# Patient Record
Sex: FEMALE | Race: WHITE | NOT HISPANIC OR LATINO | URBAN - METROPOLITAN AREA
[De-identification: names, ages, dates, MRNs, and addresses within clinical notes are randomized per-mention and may not be internally consistent; named-entity substitution may affect disease eponyms.]

---

## 2017-08-07 ENCOUNTER — OFFICE VISIT - HEALTHEAST (OUTPATIENT)
Dept: GERIATRICS | Facility: CLINIC | Age: 82
End: 2017-08-07

## 2017-08-07 DIAGNOSIS — I10 ESSENTIAL HYPERTENSION WITH GOAL BLOOD PRESSURE LESS THAN 140/90: ICD-10-CM

## 2017-08-07 DIAGNOSIS — I25.10 CORONARY ARTERY DISEASE DUE TO CALCIFIED CORONARY LESION: ICD-10-CM

## 2017-08-07 DIAGNOSIS — T79.6XXD TRAUMATIC RHABDOMYOLYSIS, SUBSEQUENT ENCOUNTER: ICD-10-CM

## 2017-08-07 DIAGNOSIS — S27.0XXD TRAUMATIC PNEUMOTHORAX, SUBSEQUENT ENCOUNTER: ICD-10-CM
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
## 2017-08-07 DIAGNOSIS — E03.4 HYPOTHYROIDISM DUE TO ACQUIRED ATROPHY OF THYROID: ICD-10-CM

## 2017-08-07 DIAGNOSIS — I25.84 CORONARY ARTERY DISEASE DUE TO CALCIFIED CORONARY LESION: ICD-10-CM

## 2017-08-07 DIAGNOSIS — F17.200 TOBACCO DEPENDENCE: ICD-10-CM

## 2017-08-07 DIAGNOSIS — H91.93 SEVERE HEARING LOSS, BILATERAL: ICD-10-CM

## 2017-08-07 DIAGNOSIS — S22.41XD CLOSED FRACTURE OF MULTIPLE RIBS OF RIGHT SIDE WITH ROUTINE HEALING, SUBSEQUENT ENCOUNTER: ICD-10-CM

## 2017-08-10 ENCOUNTER — OFFICE VISIT - HEALTHEAST (OUTPATIENT)
Dept: GERIATRICS | Facility: CLINIC | Age: 82
End: 2017-08-10

## 2017-08-10 DIAGNOSIS — F17.200 TOBACCO DEPENDENCE: ICD-10-CM

## 2017-08-10 DIAGNOSIS — S22.41XD CLOSED FRACTURE OF MULTIPLE RIBS OF RIGHT SIDE WITH ROUTINE HEALING, SUBSEQUENT ENCOUNTER: ICD-10-CM

## 2017-08-10 DIAGNOSIS — S27.0XXD TRAUMATIC PNEUMOTHORAX, SUBSEQUENT ENCOUNTER: ICD-10-CM

## 2017-08-14 ENCOUNTER — OFFICE VISIT - HEALTHEAST (OUTPATIENT)
Dept: GERIATRICS | Facility: CLINIC | Age: 82
End: 2017-08-14

## 2017-08-14 DIAGNOSIS — H61.91 SKIN LESION OF RIGHT EAR: ICD-10-CM

## 2017-08-24 ENCOUNTER — OFFICE VISIT - HEALTHEAST (OUTPATIENT)
Dept: GERIATRICS | Facility: CLINIC | Age: 82
End: 2017-08-24

## 2017-08-24 DIAGNOSIS — R05.8 RESPIRATORY TRACT CONGESTION WITH COUGH: ICD-10-CM

## 2017-08-24 DIAGNOSIS — F17.200 TOBACCO DEPENDENCE: ICD-10-CM

## 2017-08-24 DIAGNOSIS — S27.0XXD TRAUMATIC PNEUMOTHORAX, SUBSEQUENT ENCOUNTER: ICD-10-CM

## 2017-08-24 DIAGNOSIS — S22.41XD CLOSED FRACTURE OF MULTIPLE RIBS OF RIGHT SIDE WITH ROUTINE HEALING, SUBSEQUENT ENCOUNTER: ICD-10-CM

## 2017-09-01 ENCOUNTER — HOME CARE/HOSPICE - HEALTHEAST (OUTPATIENT)
Dept: HOSPICE | Facility: HOSPICE | Age: 82
End: 2017-09-01

## 2017-09-08 ENCOUNTER — HOME CARE/HOSPICE - HEALTHEAST (OUTPATIENT)
Dept: HOSPICE | Facility: HOSPICE | Age: 82
End: 2017-09-08

## 2017-09-12 ENCOUNTER — HOME CARE/HOSPICE - HEALTHEAST (OUTPATIENT)
Dept: HOSPICE | Facility: HOSPICE | Age: 82
End: 2017-09-12

## 2017-09-14 ENCOUNTER — AMBULATORY - HEALTHEAST (OUTPATIENT)
Dept: GERIATRICS | Facility: CLINIC | Age: 82
End: 2017-09-14

## 2017-09-18 ENCOUNTER — HOME CARE/HOSPICE - HEALTHEAST (OUTPATIENT)
Dept: HOSPICE | Facility: HOSPICE | Age: 82
End: 2017-09-18

## 2021-05-26 ENCOUNTER — RECORDS - HEALTHEAST (OUTPATIENT)
Dept: ADMINISTRATIVE | Facility: CLINIC | Age: 86
End: 2021-05-26

## 2021-05-28 ENCOUNTER — RECORDS - HEALTHEAST (OUTPATIENT)
Dept: ADMINISTRATIVE | Facility: CLINIC | Age: 86
End: 2021-05-28

## 2021-05-31 VITALS — WEIGHT: 115 LBS | BODY MASS INDEX: 18.56 KG/M2

## 2021-05-31 VITALS — BODY MASS INDEX: 18.72 KG/M2 | WEIGHT: 116 LBS

## 2021-05-31 VITALS — BODY MASS INDEX: 18.37 KG/M2 | WEIGHT: 113.8 LBS

## 2021-06-12 NOTE — PROGRESS NOTES
Code Status:  DNR/DNI  Visit Type: Problem Visit (Breakthrough pain)     Facility:  [facility] [[id number]]        Facility Type: SNF (Skilled Nursing Facility, TCU)    History of Present Illness: Andreina Gold is a 89 y.o. female I was asked to see today because of continued breakthrough pain may be limiting therapy.  She is needing to wear her headphones to communicate because of the severe hearing loss.  In addition to this my concern that she did not get started on her incentive spirometry yet.  She has no complaints but staff report that she does have some pain behavior when she is particularly trying to do therapy.    Review of Systems     Physical Exam   Cardiovascular:   Heart not rapid   Pulmonary/Chest:   Surprisingly fairly good air movement with no extra adventitial sounds.  Some dullness to percussion and some decreased air sounds posteriorly but this is actually bilateral   Vitals reviewed.      Labs:  All labs reviewed in the nursing home record.    Assessment:  1. Closed fracture of multiple ribs of right side with routine healing, subsequent encounter     2. Traumatic pneumothorax, subsequent encounter     3. Tobacco dependence         Plan: We will reinforce the importance of doing the Telles spirometry at bedside 5-10 times a day.  I will change the Lortab 5/320 [id number] 3 times daily scheduled and every 4 hours as needed.      25 minutes spent of which greater than 65% was face to face communication with the patient about above plan of care    Electronically signed by: Gabriel Hernandez MD

## 2021-06-12 NOTE — PROGRESS NOTES
Code Status:  DNR  Visit Type: H & P     Facility:  [facility] [[id number]]      Facility Type: SNF (Skilled Nursing Facility, TCU)    History of Present Illness:   Hospital Admission Date: July 31, 2017 [facility] hospital Discharge Date: August 3, 2017  Facility Admission Date: August 3, 2017 [facility] transitional care unit    Andreina Gold is a 89 y.o. female who lives alone in had a fall in the night where she was unable to get up and subsequently called her son the next day at 4 in the afternoon.  He picked her up and they went to the emergency department.  She apparently spent some time trying to get upright but was unable to do so.  She is normally fully ambulatory and does her own cooking.  Her son does provide her with assistance and shopping.  She has an underlying medical history of coronary disease with a PCI in 2012, hypertension, hypothyroidism.  In addition she is a pack a day smoker.    Workup in the emergency department showed from chest x-ray multiple displaced fractures involving the right lateral sixth seventh ribs as well as the left fourth fifth and sixth ribs and a significant right apical pneumothorax.  Because of this they did place a chest tube.    Hospital course; discovering elevated creatinine kinase and a leukocytosis which was felt to be reactive as it improved off of antibiotics.  They gave her IV fluids and the follow-up chest x-ray showed resolution of the pneumothorax.  Pain was controlled by oral hydrocodone/acetaminophen.  The CK level was followed and was improved.  One day prior to discharge they did remove the chest tube and she tolerated that well.  She did not desaturate off of oxygen and had no hypoxia or respiratory distress.  She was transferred to us on hydrocodone/acetaminophen 1 tablet every 6 hours as needed and she is only asking for it intermittently.  They also requested Accu-Cheks 2 times a day and these have all been  excellent.    Past Medical History:   Diagnosis Date     Cancer      Past Surgical History:   Procedure Laterality Date     JOINT REPLACEMENT       NECK SURGERY       URINARY SURGERY       Family History   Problem Relation Age of Onset     Hypertension Mother      Stroke Mother      Hypertension Father      Social History     Social History     Marital status:      Spouse name: N/A     Number of children: N/A     Years of education: N/A     Occupational History     Not on file.     Social History Main Topics     Smoking status: Current Every Day Smoker     Packs/day: 0.50     Years: 50.00     Smokeless tobacco: Never Used     Alcohol use No     Drug use: No     Sexual activity: Not on file     Other Topics Concern     Not on file     Social History Narrative     Additional Geriatric Review Cook's cleans.  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  Current Outpatient Prescriptions   Medication Sig Dispense Refill     acetaminophen (TYLENOL) 500 MG tablet Take 1 tablet (500 mg total) by mouth every 6 (six) hours as needed for pain (For mild pain).  0     ferrous gluconate (FERGON) 324 MG tablet Take 324 mg by mouth daily with breakfast.       HYDROcodone-acetaminophen 5-325 mg per tablet Take 1 tablet by mouth every 6 (six) hours as needed for pain (For moderate to severe pain). 15 tablet 0     levothyroxine (SYNTHROID, LEVOTHROID) 75 MCG tablet Take 75 mcg by mouth daily.       lisinopril (PRINIVIL,ZESTRIL) 2.5 MG tablet Take 2.5 mg by mouth daily.       multivitamin with minerals (THERA-M) 9 mg iron-400 mcg Tab tablet Take 1 tablet by mouth daily.  0     No current facility-administered medications for this visit.      No Known Allergies    There is no immunization history on file for this patient.      Review of Systems   Constitutional: Negative.  Negative for appetite change, chills, diaphoresis, fatigue, fever and unexpected weight change.   HENT:  Positive for hearing loss. Negative for congestion, dental problem, ear pain, nosebleeds, sinus pressure, sore throat, tinnitus and trouble swallowing.    Eyes: Positive for visual disturbance. Negative for photophobia, pain, discharge and itching.   Respiratory: Negative for apnea, cough, chest tightness, shortness of breath and wheezing.    Cardiovascular: Negative for chest pain and palpitations.   Gastrointestinal: Negative for abdominal distention, abdominal pain, constipation and diarrhea.   Endocrine: Negative for cold intolerance, heat intolerance and polydipsia.   Genitourinary: Negative.  Negative for decreased urine volume, difficulty urinating, dysuria, enuresis, frequency, hematuria, menstrual problem, urgency and vaginal discharge.   Musculoskeletal: Negative for arthralgias, back pain and gait problem.   Allergic/Immunologic: Negative.    Neurological: Negative for dizziness, tremors, syncope, facial asymmetry, speech difficulty, weakness, light-headedness, numbness and headaches.   Hematological: Negative.    Psychiatric/Behavioral: Negative for agitation, behavioral problems, confusion, decreased concentration, dysphoric mood and hallucinations. The patient is not hyperactive.         Physical Exam   Constitutional: She is oriented to person, place, and time. She appears well-developed and well-nourished.   HENT:   Head: Normocephalic and atraumatic.   Right Ear: External ear normal.   Left Ear: External ear normal.   Nose: Nose normal.   Mouth/Throat: Oropharynx is clear and moist.   Requires phones and pocket talker to communicate   Eyes: Conjunctivae and EOM are normal. Pupils are equal, round, and reactive to light. Left eye exhibits no discharge. No scleral icterus.   Neck: Neck supple. No JVD present. No tracheal deviation present. No thyromegaly present.   Cardiovascular: Normal rate, regular rhythm and normal heart sounds.  Exam reveals no friction rub.    No murmur heard.  Pulmonary/Chest:  Effort normal and breath sounds normal. No respiratory distress. She has no wheezes. She has no rales. She exhibits no tenderness.   Abdominal: Soft. Bowel sounds are normal. She exhibits no distension and no mass. There is no tenderness. There is no guarding.   Musculoskeletal: Normal range of motion. She exhibits no edema or tenderness.   Lymphadenopathy:     She has no cervical adenopathy.   Neurological: She is alert and oriented to person, place, and time. She has normal reflexes. No cranial nerve deficit. She exhibits normal muscle tone. Coordination normal.   Skin: Skin is warm and dry. No rash noted. No erythema.   Psychiatric: She has a normal mood and affect. Her behavior is normal. Thought content normal.         Labs:  All labs reviewed in the nursing home record.  Recent Results (from the past 240 hour(s))   Urinalysis-UC if Indicated   Result Value Ref Range    Color, UA Yellow Colorless, Yellow, Straw, Light Yellow    Clarity, UA Cloudy (!) Clear    Glucose, UA Negative Negative    Bilirubin, UA Negative Negative    Ketones, UA Negative Negative    Specific Gravity, UA 1.009 1.001 - 1.030    Blood, UA Trace (!) Negative    pH, UA 5.0 4.5 - 8.0    Protein, UA 30 mg/dL (!) Negative mg/dL    Urobilinogen, UA <2.0 E.U./dL <2.0 E.U./dL, 2.0 E.U./dL    Nitrite, UA Negative Negative    Leukocytes, UA Negative Negative    Bacteria, UA Few (!) None Seen hpf    RBC, UA 0-2 None Seen, 0-2 hpf    WBC, UA 0-5 None Seen, 0-5 hpf    Squam Epithel, UA 0-5 None Seen, 0-5 lpf    Granular Casts, UA 0-5 (!) None Seen lpf    Hyaline Casts, UA 5-10 (!) 0-5, None Seen lpf   HM1 (CBC with Diff)   Result Value Ref Range    WBC 23.4 (H) 4.0 - 11.0 thou/uL    RBC 4.65 3.80 - 5.40 mill/uL    Hemoglobin 14.3 12.0 - 16.0 g/dL    Hematocrit 41.8 35.0 - 47.0 %    MCV 90 80 - 100 fL    MCH 30.8 27.0 - 34.0 pg    MCHC 34.2 32.0 - 36.0 g/dL    RDW 14.9 (H) 11.0 - 14.5 %    Platelets 318 140 - 440 thou/uL    MPV 11.5 8.5 - 12.5 fL     Neutrophils % 86 (H) 50 - 70 %    Lymphocytes % 4 (L) 20 - 40 %    Monocytes % 10 2 - 10 %    Eosinophils % 0 0 - 6 %    Basophils % 0 0 - 2 %    Neutrophils Absolute 19.8 (H) 2.0 - 7.7 thou/uL    Lymphocytes Absolute 1.0 0.8 - 4.4 thou/uL    Monocytes Absolute 2.3 (H) 0.0 - 0.9 thou/uL    Eosinophils Absolute 0.0 0.0 - 0.4 thou/uL    Basophils Absolute 0.0 0.0 - 0.2 thou/uL   ECG 12 lead with MUSE   Result Value Ref Range    SYSTOLIC BLOOD PRESSURE  mmHg    DIASTOLIC BLOOD PRESSURE  mmHg    VENTRICULAR RATE 96 BPM    ATRIAL RATE 315 BPM    P-R INTERVAL  ms    QRS DURATION 76 ms    Q-T INTERVAL 368 ms    QTC CALCULATION (BEZET) 464 ms    P Axis 82 degrees    R AXIS -80 degrees    T AXIS 84 degrees    MUSE DIAGNOSIS       Normal sinus rhythm Premature atrial complexes  Right atrial enlargement  Left axis deviation  Inferior infarct (cited on or before 16-MAR-1999)  Anterolateral infarct (cited on or before 02-FEB-2003)  Abnormal ECG  When compared with ECG of 08-NOV-2012 07:59,  T wave abnormality, consider anterior ischemia has resolved and now anterior q waves present  Confirmed by ABEBE WHARTON, LES LOC:JN ([id number]) on 8/2/2017 1:54:06 PM     Basic Metabolic Panel   Result Value Ref Range    Sodium 141 136 - 145 mmol/L    Potassium 4.1 3.5 - 5.0 mmol/L    Chloride 106 98 - 107 mmol/L    CO2 26 22 - 31 mmol/L    Anion Gap, Calculation 9 5 - 18 mmol/L    Glucose 131 (H) 70 - 125 mg/dL    Calcium 10.1 8.5 - 10.5 mg/dL    BUN 14 8 - 28 mg/dL    Creatinine 0.83 0.60 - 1.10 mg/dL    GFR MDRD Af Amer >60 >60 mL/min/1.73m2    GFR MDRD Non Af Amer >60 >60 mL/min/1.73m2   Troponin I   Result Value Ref Range    Troponin I 0.03 0.00 - 0.29 ng/mL   CK   Result Value Ref Range    CK, Total 1686 (HH) 30 - 190 U/L   Lactic Acid   Result Value Ref Range    Lactic Acid 2.0 0.5 - 2.2 mmol/L   INR   Result Value Ref Range    INR 1.08 0.90 - 1.10   APTT   Result Value Ref Range    PTT 32 24 - 37 seconds   Basic metabolic panel   Result  Value Ref Range    Sodium 140 136 - 145 mmol/L    Potassium 3.9 3.5 - 5.0 mmol/L    Chloride 109 (H) 98 - 107 mmol/L    CO2 23 22 - 31 mmol/L    Anion Gap, Calculation 8 5 - 18 mmol/L    Glucose 136 (H) 70 - 125 mg/dL    Calcium 9.0 8.5 - 10.5 mg/dL    BUN 13 8 - 28 mg/dL    Creatinine 0.76 0.60 - 1.10 mg/dL    GFR MDRD Af Amer >60 >60 mL/min/1.73m2    GFR MDRD Non Af Amer >60 >60 mL/min/1.73m2   Hemogram with PLT   Result Value Ref Range    WBC 16.6 (H) 4.0 - 11.0 thou/uL    RBC 4.00 3.80 - 5.40 mill/uL    Hemoglobin 12.0 12.0 - 16.0 g/dL    Hematocrit 36.1 35.0 - 47.0 %    MCV 90 80 - 100 fL    MCH 30.0 27.0 - 34.0 pg    MCHC 33.2 32.0 - 36.0 g/dL    RDW 14.9 (H) 11.0 - 14.5 %    Platelets 268 140 - 440 thou/uL    MPV 11.5 8.5 - 12.5 fL   Lactic Acid   Result Value Ref Range    Lactic Acid 1.4 0.5 - 2.2 mmol/L   CK Total   Result Value Ref Range    CK, Total 1052 (HH) 30 - 190 U/L   Phosphorus   Result Value Ref Range    Phosphorus 2.5 2.5 - 4.5 mg/dL   Potassium   Result Value Ref Range    Potassium 3.4 (L) 3.5 - 5.0 mmol/L   Magnesium   Result Value Ref Range    Magnesium 1.9 1.8 - 2.6 mg/dL   CK Total   Result Value Ref Range    CK, Total 541 (HH) 30 - 190 U/L   Creatinine   Result Value Ref Range    Creatinine 0.69 0.60 - 1.10 mg/dL    GFR MDRD Af Amer >60 >60 mL/min/1.73m2    GFR MDRD Non Af Amer >60 >60 mL/min/1.73m2   White Blood Count (WBC)   Result Value Ref Range    WBC 12.6 (H) 4.0 - 11.0 thou/uL   Potassium   Result Value Ref Range    Potassium 3.7 3.5 - 5.0 mmol/L   CK Total   Result Value Ref Range    CK, Total 268 (H) 30 - 190 U/L   Potassium - Next AM   Result Value Ref Range    Potassium 3.6 3.5 - 5.0 mmol/L         Assessment:  1. Traumatic pneumothorax, subsequent encounter     2. Fall, subsequent encounter     3. Closed fracture of multiple ribs of right side with routine healing, subsequent encounter     4. Tobacco dependence     5. Coronary artery disease due to calcified coronary lesion      6. Traumatic rhabdomyolysis, subsequent encounter     7. Essential hypertension with goal blood pressure less than 140/90     8. Hypothyroidism due to acquired atrophy of thyroid     9. Severe hearing loss, bilateral         Plan: Patient is asked to rehabbing remarkably well.  We will place a incentive spirometry at the bedside to keep lungs moving.  We will have physical therapy work with her on her conditioning.  Her Accu-Cheks that they ordered have all been normal we will change this to nurse discretion only.    Total 45 minutes of which 65% was spent in counseling and coordination of care of the above plan.    Electronically signed by: Gabriel Hernandez MD

## 2021-06-12 NOTE — PROGRESS NOTES
Code Status:  DNR  Visit Type: Problem Visit (Increase cough congestion)     Facility:  [facility] [[id number]]        Facility Type: SNF (Skilled Nursing Facility, TCU)    History of Present Illness: Andreina Gold is a 89 y.o. female is admitted to us after fall and had not only a tension pneumothorax but multiple rib fractures.  She has been using her Voltaren regularly however recently she has had increased congestion and a little bit of agitation and anxiety around respirations.  They have increased slightly but there is been no desaturation.    Review of Systems     Physical Exam   Constitutional:   Patient is alert and assertive with us at this morning.   Cardiovascular:   Nonrapid and distant.   Pulmonary/Chest:   Increase anterior rhonchi both on inspiration and expiration more so than   Vitals reviewed.      Labs:  All labs reviewed in the nursing home record.    Assessment:  1. Respiratory tract congestion with cough     2. Closed fracture of multiple ribs of right side with routine healing, subsequent encounter     3. Traumatic pneumothorax, subsequent encounter     4. Tobacco dependence         Plan: We will get a chest x-ray PA and lateral as well as acute Mucinex 600 mg 1 twice daily ×7 days      25 minutes spent of which greater than 65% was face to face communication with the patient about above plan of care    Electronically signed by: Gabriel Hernandez MD

## 2021-06-12 NOTE — PROGRESS NOTES
Code Status:  DNR/DNI  Visit Type: Problem Visit (ear complaint)     Facility:  [facility] [[id number]]        Facility Type: SNF (Skilled Nursing Facility, TCU)    History of Present Illness: Andreina Gold is a 89 y.o. female who we are asked to see follow-up today for a right ear lesion that staff is noted to be growing in size.  Her significant other she had a similar lesion years ago that was drained and it has been a resident now about the same size for over a year.  There is been no drainage, redness, or perceived pain for the patient by staff.    Review of Systems   Denies fevers, drainage    Physical Exam    General: Adult Female sitting in chair, NAD  HEENT: Atraumatic, right preauricular area has a 1-2cm round subcutaneous mass that is freely mobile.  There is a 0.5-1cm opening in the center with some hardened necrotic tissue present.  No drainage or surrounding erythema.  Neck: Supple  Psych: Calm, normal affect    Labs:  All labs reviewed in the nursing home record.    Assessment:  1. Skin lesion of right ear         Plan: I think this represents a chronic sebaceous cyst with some necrotic tissue in the center and it is appearing larger now because she is wearing her assisted hearing headphones more frequently in the nursing home than she has at home which is putting more pressure in the area.  Since it does not look infected I do not feel intervention at this time is a good idea especially since she was to be putting pressure on the area.  We will simply monitor it for now keep it clean and dry.  This lesion likely represents a plugged sebaceous cyst.  He clearly appears benign nonmalignant.  The effort to excise it and the risks of infection greatly exceed the small benefit from having this removed.    Discussed with Dr. Hernandez.    Jaren Smart DO PGY3 was discussed chart examined and patient examined and I agree with Dr. Dany Smart third-year family practice residents assessment and plan.  Gabriel Hernandez MD    [facility]

## 2021-06-16 PROBLEM — I25.10 CORONARY ARTERY DISEASE DUE TO CALCIFIED CORONARY LESION: Status: ACTIVE | Noted: 2017-08-01

## 2021-06-16 PROBLEM — I10 ESSENTIAL HYPERTENSION WITH GOAL BLOOD PRESSURE LESS THAN 140/90: Status: ACTIVE | Noted: 2017-08-01

## 2021-06-16 PROBLEM — J18.9 HCAP (HEALTHCARE-ASSOCIATED PNEUMONIA): Status: ACTIVE | Noted: 2017-08-27
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
## 2021-06-16 PROBLEM — R09.02 HYPOXIA: Status: ACTIVE | Noted: 2017-08-27

## 2021-06-16 PROBLEM — S22.41XD CLOSED FRACTURE OF MULTIPLE RIBS OF RIGHT SIDE WITH ROUTINE HEALING, SUBSEQUENT ENCOUNTER: Status: ACTIVE | Noted: 2017-08-01

## 2021-06-16 PROBLEM — E03.4 HYPOTHYROIDISM DUE TO ACQUIRED ATROPHY OF THYROID: Status: ACTIVE | Noted: 2017-08-01

## 2021-06-16 PROBLEM — I25.84 CORONARY ARTERY DISEASE DUE TO CALCIFIED CORONARY LESION: Status: ACTIVE | Noted: 2017-08-01

## 2021-06-16 PROBLEM — Z87.891 HISTORY OF TOBACCO ABUSE: Status: ACTIVE | Noted: 2017-08-01

## 2021-06-16 PROBLEM — E44.0 MODERATE PROTEIN-CALORIE MALNUTRITION (H): Status: ACTIVE | Noted: 2017-08-27

## 2021-07-14 PROBLEM — T79.6XXD TRAUMATIC RHABDOMYOLYSIS, SUBSEQUENT ENCOUNTER: Status: RESOLVED | Noted: 2017-08-01 | Resolved: 2017-08-27
